# Patient Record
Sex: MALE | Race: BLACK OR AFRICAN AMERICAN | NOT HISPANIC OR LATINO | Employment: FULL TIME | URBAN - METROPOLITAN AREA
[De-identification: names, ages, dates, MRNs, and addresses within clinical notes are randomized per-mention and may not be internally consistent; named-entity substitution may affect disease eponyms.]

---

## 2017-08-03 ENCOUNTER — HOSPITAL ENCOUNTER (EMERGENCY)
Facility: OTHER | Age: 32
Discharge: HOME | End: 2017-08-03
Attending: EMERGENCY MEDICINE

## 2017-08-03 VITALS
SYSTOLIC BLOOD PRESSURE: 121 MMHG | TEMPERATURE: 98 F | HEIGHT: 71 IN | WEIGHT: 208 LBS | OXYGEN SATURATION: 99 % | BODY MASS INDEX: 29.12 KG/M2 | DIASTOLIC BLOOD PRESSURE: 76 MMHG | HEART RATE: 61 BPM | RESPIRATION RATE: 14 BRPM

## 2017-08-03 DIAGNOSIS — S83.401A SPRAIN OF COLLATERAL LIGAMENT OF RIGHT KNEE, INITIAL ENCOUNTER: Primary | ICD-10-CM

## 2017-08-03 DIAGNOSIS — M62.838 TRAPEZIUS MUSCLE SPASM: ICD-10-CM

## 2017-08-03 DIAGNOSIS — T14.90XA TRAUMA: ICD-10-CM

## 2017-08-03 DIAGNOSIS — S40.012A CONTUSION OF LEFT SHOULDER, INITIAL ENCOUNTER: ICD-10-CM

## 2017-08-03 PROCEDURE — 29505 APPLICATION LONG LEG SPLINT: CPT

## 2017-08-03 PROCEDURE — 25000003 PHARM REV CODE 250: Performed by: EMERGENCY MEDICINE

## 2017-08-03 PROCEDURE — 99284 EMERGENCY DEPT VISIT MOD MDM: CPT | Mod: 25

## 2017-08-03 RX ORDER — IBUPROFEN 400 MG/1
800 TABLET ORAL
Status: COMPLETED | OUTPATIENT
Start: 2017-08-03 | End: 2017-08-03

## 2017-08-03 RX ORDER — IBUPROFEN 600 MG/1
600 TABLET ORAL EVERY 6 HOURS PRN
Qty: 20 TABLET | Refills: 0 | Status: SHIPPED | OUTPATIENT
Start: 2017-08-03 | End: 2019-11-12 | Stop reason: SDUPTHER

## 2017-08-03 RX ORDER — CYCLOBENZAPRINE HCL 10 MG
10 TABLET ORAL 3 TIMES DAILY PRN
Qty: 15 TABLET | Refills: 0 | Status: SHIPPED | OUTPATIENT
Start: 2017-08-03 | End: 2017-08-08

## 2017-08-03 RX ORDER — HYDROCODONE BITARTRATE AND ACETAMINOPHEN 5; 325 MG/1; MG/1
1 TABLET ORAL EVERY 4 HOURS PRN
Qty: 6 TABLET | Refills: 0 | Status: SHIPPED | OUTPATIENT
Start: 2017-08-03 | End: 2017-08-13

## 2017-08-03 RX ORDER — CYCLOBENZAPRINE HCL 10 MG
10 TABLET ORAL
Status: COMPLETED | OUTPATIENT
Start: 2017-08-03 | End: 2017-08-03

## 2017-08-03 RX ORDER — HYDROCODONE BITARTRATE AND ACETAMINOPHEN 5; 325 MG/1; MG/1
1 TABLET ORAL
Status: COMPLETED | OUTPATIENT
Start: 2017-08-03 | End: 2017-08-03

## 2017-08-03 RX ADMIN — HYDROCODONE BITARTRATE AND ACETAMINOPHEN 1 TABLET: 5; 325 TABLET ORAL at 07:08

## 2017-08-03 RX ADMIN — IBUPROFEN 800 MG: 400 TABLET, FILM COATED ORAL at 07:08

## 2017-08-03 RX ADMIN — CYCLOBENZAPRINE HYDROCHLORIDE 10 MG: 10 TABLET, FILM COATED ORAL at 07:08

## 2017-08-04 NOTE — ED PROVIDER NOTES
"Encounter Date: 8/3/2017    SCRIBE #1 NOTE: I, Brenda Gray, am scribing for, and in the presence of, Dr. Hendrickson.       History     Chief Complaint   Patient presents with    Knee Pain     knee and L shoulder pain after collision with another player playing basketball     Time seen by provider: 7:07 PM    This is a 32 y.o. male who presents to the ED with complaint of right knee and left shoulder pain. Symptoms began 22 hours ago s/p a collision with another person while playing basketball. Patiet reports that another player's elbow came down on his left shoulder and stepped on his right ankle.  The patient states that when the ankle was injured he felt a "pop" in his right knee.  The pain is located on the medial side of the right knee and is described as "kicking." It is worse with bending of the knee and rates 9/10. He describes his left shoulder pain as "sharp." He reports that he was unable to walk away afterwards and has been limping since. He also notes pain in the left side of his neck, but denies any fever, chills, nausea, vomiting, congestion, sore throat, back pain, abdominal pain, SOB, CP, numbness, tingling or loss of consciousness. He has not taken any medication for the pain. He has no hx of HTN, DM or asthma, and denies use of any tobacco, alcohol, or illicit drugs. NKDA, no PSHX.      The history is provided by the patient.     Review of patient's allergies indicates:   Allergen Reactions    Latex, natural rubber      History reviewed. No pertinent past medical history.  History reviewed. No pertinent surgical history.  History reviewed. No pertinent family history.  Social History   Substance Use Topics    Smoking status: Never Smoker    Smokeless tobacco: Not on file    Alcohol use Not on file     Review of Systems   Constitutional: Negative for chills and fever.   HENT: Negative for congestion and sore throat.    Respiratory: Negative for cough and shortness of breath.    Cardiovascular: " Negative for chest pain.   Gastrointestinal: Negative for abdominal pain, nausea and vomiting.   Genitourinary: Negative for dysuria.   Musculoskeletal: Positive for neck pain (left-sided). Negative for back pain.        Positive for left shoulder pain. Positive for right knee pain.   Skin: Negative for rash.   Neurological: Negative for syncope, weakness and numbness.   Hematological: Does not bruise/bleed easily.   Psychiatric/Behavioral: Negative for behavioral problems.       Physical Exam     Initial Vitals [08/03/17 1808]   BP Pulse Resp Temp SpO2   126/79 67 16 98.4 °F (36.9 °C) 96 %      MAP       94.67         Physical Exam    Nursing note and vitals reviewed.  Constitutional: He appears well-developed and well-nourished. He is not diaphoretic. No distress.   HENT:   Head: Normocephalic and atraumatic.   Nose: Nose normal.   Mouth/Throat: Oropharynx is clear and moist.   Eyes: Conjunctivae and EOM are normal. Pupils are equal, round, and reactive to light. No scleral icterus.   Neck: Normal range of motion. Neck supple. No JVD present.   Cardiovascular: Normal rate, regular rhythm and normal heart sounds. Exam reveals no gallop and no friction rub.    No murmur heard.  Pulmonary/Chest: Breath sounds normal. No respiratory distress. He has no wheezes. He has no rhonchi. He has no rales.   Abdominal: Soft. He exhibits no distension. There is no tenderness. There is no rebound and no guarding.   Musculoskeletal: He exhibits tenderness (Tenderness to palpation of the left upper head of the trapezius with palpable muscle spasm. No tenderness over left AC joint. Tenderness to palpation in deep left axilla. Tenderness to the medial acpect of the right knee.  ). He exhibits no edema.   No midline spinal tenderness. Painful range of motion of left shoulder. No effusion present in right knee. Mild crepitus present in the right knee. Negative anterior drawer's sign. Slight laxity with valgus stress.  No tenderness to  palpation of ankle joint, elbow joints, wrists.   Neurological: He is alert and oriented to person, place, and time. He has normal strength.   Normal sensation.   Skin: Skin is warm and dry. No rash and no abscess noted. No erythema. No pallor.   No overlying skin change of right knee.   Psychiatric: He has a normal mood and affect. His behavior is normal. Judgment and thought content normal.         ED Course   Splint Application  Date/Time: 8/3/2017 7:40 PM  Performed by: CLARIBEL BENSON  Authorized by: CLARIBEL BENSON   Location details: right knee  Splint type: knee immobilizer.  Supplies used: cotton padding  Post-procedure: The splinted body part was neurovascularly unchanged following the procedure.  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  Correlation with physical findings is recommended.  No convincing evidence for acute fracture, bone destruction, or dislocation.      Electronically signed by: DUKE MANUEL MD  Date:     08/03/17  Time:    19:43              Narrative:    AP, lateral, and sunrise views of the right knee were obtained.  There is a curvilinear lucency within the cephalad and lateral aspect of the right patella.  This is likely related to a bipartite patella rather than an acute fracture.  The remainder of the bones appear intact.  There is no evidence for dislocation.  There is no plain film evidence for joint effusion.  Soft tissues are unremarkable.                             X-Ray Shoulder Trauma Left (Final result)  Result time 08/03/17 19:45:08    Final result by Duke Manuel MD (08/03/17 19:45:08)                 Impression:      No evidence for acute  fracture, bone destruction, or dislocation.      Electronically signed by: HIRA JOHNSON MD  Date:     08/03/17  Time:    19:45              Narrative:    3 views of the left shoulder were obtained.  The bones are intact.  There is no evidence for acute fracture or bone destruction.  Joint spaces are well-maintained.  There is no evidence for dislocation.  Soft tissues are unremarkable.                                   X-Rays:   Independently Interpreted Readings:   Other Readings:  Left shoulder: No fracture or dislocation. No acute process.  Right knee: There appears to be an old fracture to the superior lateral aspect of the patella. No other fracture or dislocation.     Medical Decision Making:   Independently Interpreted Test(s):   I have ordered and independently interpreted X-rays - see prior notes.  Clinical Tests:   Radiological Study: Ordered and Reviewed  ED Management:  Emergent evaluation of 32-year-old male with complaint of left shoulder and right knee pain after trauma.  Vital signs are benign, afebrile.  X-ray of the shoulder shows no acute process, and I suspect contusion with muscle spasm.  X-ray of the right knee shows a bipartite patella but no acute finding.  Based on his physical exam I suspect injury to the medial collateral ligament.  He was placed in an immobilizer and treated with anti-inflammatories, muscle relaxers, and Norco.  He's discharged in good condition with crutches and prescription for same medications.  He is advised close follow-up with orthopedics or return for any new or worsening symptoms.    Additional MDM:   X-Rays: I have independently interpreted X-Ray(s) - see notes.          Scribe Attestation:   Scribe #1: I performed the above scribed service and the documentation accurately describes the services I performed. I attest to the accuracy of the note.    Attending Attestation:           Physician Attestation for Scribe:  Physician Attestation Statement for Scribe #1:  I, Dr. Hendrickson, reviewed documentation, as scribed by Brenda Gray in my presence, and it is both accurate and complete.                 ED Course     Clinical Impression:     1. Sprain of collateral ligament of right knee, initial encounter    2. Trauma    3. Contusion of left shoulder, initial encounter    4. Trapezius muscle spasm                                 Shanthi Hendrickson MD  08/04/17 0993

## 2017-08-04 NOTE — ED NOTES
Pt given d/c instructions to include medications and follow up care and verbalized understanding. Pt verbalized understanding. Pt ambulated with proper crutch use and without complication to d/c window. Pt d/c in stable condition.

## 2019-11-12 ENCOUNTER — HOSPITAL ENCOUNTER (EMERGENCY)
Facility: OTHER | Age: 34
Discharge: HOME | End: 2019-11-12
Attending: EMERGENCY MEDICINE

## 2019-11-12 VITALS
TEMPERATURE: 99 F | HEIGHT: 71 IN | HEART RATE: 66 BPM | RESPIRATION RATE: 16 BRPM | WEIGHT: 214 LBS | DIASTOLIC BLOOD PRESSURE: 88 MMHG | OXYGEN SATURATION: 98 % | SYSTOLIC BLOOD PRESSURE: 132 MMHG | BODY MASS INDEX: 29.96 KG/M2

## 2019-11-12 DIAGNOSIS — S86.911A KNEE STRAIN, RIGHT, INITIAL ENCOUNTER: Primary | ICD-10-CM

## 2019-11-12 DIAGNOSIS — T14.90XA INJURY: ICD-10-CM

## 2019-11-12 PROCEDURE — 99284 EMERGENCY DEPT VISIT MOD MDM: CPT | Mod: 25

## 2019-11-12 PROCEDURE — 96372 THER/PROPH/DIAG INJ SC/IM: CPT

## 2019-11-12 PROCEDURE — 63600175 PHARM REV CODE 636 W HCPCS: Performed by: PHYSICIAN ASSISTANT

## 2019-11-12 RX ORDER — IBUPROFEN 800 MG/1
800 TABLET ORAL EVERY 6 HOURS PRN
Qty: 30 TABLET | Refills: 0 | Status: SHIPPED | OUTPATIENT
Start: 2019-11-12

## 2019-11-12 RX ORDER — KETOROLAC TROMETHAMINE 30 MG/ML
30 INJECTION, SOLUTION INTRAMUSCULAR; INTRAVENOUS
Status: COMPLETED | OUTPATIENT
Start: 2019-11-12 | End: 2019-11-12

## 2019-11-12 RX ADMIN — KETOROLAC TROMETHAMINE 30 MG: 30 INJECTION, SOLUTION INTRAMUSCULAR; INTRAVENOUS at 05:11

## 2019-11-12 NOTE — ED PROVIDER NOTES
"Encounter Date: 11/12/2019       History     Chief Complaint   Patient presents with    Knee Pain     R knee, reports R knee swelling.  "I fell playing ball yesterday.  My leg got caught under me."  Ambulatory but limping.     34-year-old male with no significant past medical history presents emergency department with complaints of right knee pain. He reports pain to the medial aspect of the knee after an injury while playing basketball.  Patient states that he fell back with his leg underneath his buttocks.  He reports that he has had pain ever since the injury yesterday.  He admits to taking ibuprofen without relief of the pain.  He admits to a previous injury to his knee but is unsure of which knee he had injured in the past.  According to patient's chart it was the right knee and the pain was in the same location.  He admits that he is able to ambulate however states that it is difficult and painful.  He complains of pain as an 8/10.    The history is provided by the patient.     Review of patient's allergies indicates:   Allergen Reactions    Latex, natural rubber      History reviewed. No pertinent past medical history.  History reviewed. No pertinent surgical history.  History reviewed. No pertinent family history.  Social History     Tobacco Use    Smoking status: Never Smoker   Substance Use Topics    Alcohol use: Not on file    Drug use: Not on file     Review of Systems   Respiratory: Negative for shortness of breath.    Cardiovascular: Negative for chest pain.   Musculoskeletal: Positive for arthralgias (Right knee pain) and gait problem. Negative for back pain and joint swelling.   Skin: Negative for rash.   Neurological: Negative for weakness and numbness.   Hematological: Does not bruise/bleed easily.       Physical Exam     Initial Vitals [11/12/19 1611]   BP Pulse Resp Temp SpO2   122/77 62 14 98.7 °F (37.1 °C) 97 %      MAP       --         Physical Exam    Nursing note and vitals " reviewed.  Constitutional: He appears well-developed and well-nourished.  Non-toxic appearance. No distress.   HENT:   Head: Normocephalic and atraumatic.   Right Ear: External ear normal.   Left Ear: External ear normal.   Nose: Nose normal.   Eyes: Conjunctivae and lids are normal. No scleral icterus.   Neck: Normal range of motion and phonation normal.   Musculoskeletal: Normal range of motion. He exhibits tenderness. He exhibits no edema.   Right knee-palpable deformity to the patella which patient reports is chronic.  No tenderness palpation. He has pain with valgus strain with pain and tenderness palpation to the medial aspect of the right knee.  No significant edema and erythema, ecchymosis. No palpable masses, lesions or cords.  Full range of motion of the knee with pain.   Neurological: He is alert and oriented to person, place, and time. He displays no atrophy. He exhibits normal muscle tone. GCS eye subscore is 4. GCS verbal subscore is 5. GCS motor subscore is 6.   Skin: Skin is warm, dry and intact. Capillary refill takes less than 2 seconds. No abrasion, no bruising, no ecchymosis, no laceration and no rash noted.   Psychiatric: He has a normal mood and affect. His speech is normal and behavior is normal. Judgment normal. Cognition and memory are normal.         ED Course   Procedures  Labs Reviewed - No data to display       Imaging Results          X-Ray Knee 3 View Right (Final result)  Result time 11/12/19 16:43:39    Final result by Vernon Carson MD (11/12/19 16:43:39)                 Impression:      No acute findings.  Bipartite patella.      Electronically signed by: Vernon Carson MD  Date:    11/12/2019  Time:    16:43             Narrative:    EXAMINATION:  XR KNEE 3 VIEW RIGHT    CLINICAL HISTORY:  Injury, unspecified, initial encounter    TECHNIQUE:  AP, lateral, and Merchant views of the right knee were performed.    COMPARISON:  08/03/2017    FINDINGS:  No acute fracture or  dislocation.  No large knee joint effusion.  There is a bipartite patella with enthesopathy at the superior pole of the patella.  Joint spaces are relatively well maintained.                                 Medical Decision Making:   History:   Old Medical Records: I decided to obtain old medical records.  Initial Assessment:   34-year-old male with complaints consistent with right knee strain status post injury while playing basketball.  Vital signs stable, afebrile, neurovascularly intact.  He is alert and healthy and nontoxic appearing.  He is not distressed.  Exam documented above.  Concerns for strain versus fracture versus dislocation versus other soft tissue injury. No evidence of SBI or septic joint  Clinical Tests:   Radiological Study: Ordered and Reviewed  ED Management:  X-rays obtained with no evidence of fracture or dislocation.  Patient has a bipartite patella.  I am concerned for ligament strain.  Ace wrap is applied he was given crutches for comfort.  Administered Toradol in the emergency department.  Will discharge home with ibuprofen and care instructions.  He is urged to follow up with orthopedist at 1st available appointment or return to the emergency department for any worsening signs or symptoms otherwise.  He states understanding agrees with this plan.  This is the extent of patient's complaints today.  This patient was discussed with the attending physician who agrees with treatment plan  Other:   I have discussed this case with another health care provider.       <> Summary of the Discussion: Wilber  This note was created using [employer] dictation.  There may be typographical errors secondary to dictation.                                   Clinical Impression:     1. Knee strain, right, initial encounter    2. Injury            Disposition:   Disposition: Discharged  Condition: Stable                     Lelo Martell PA-C  11/12/19 5611

## 2019-11-12 NOTE — ED TRIAGE NOTES
Right knee swelling and pain with movement. Denies any other symptoms. Alert and oriented ambulatory respirations even unlabored.

## 2022-08-10 ENCOUNTER — HOSPITAL ENCOUNTER (EMERGENCY)
Facility: OTHER | Age: 37
Discharge: HOME | End: 2022-08-10
Attending: EMERGENCY MEDICINE

## 2022-08-10 VITALS
HEIGHT: 71 IN | BODY MASS INDEX: 29.26 KG/M2 | TEMPERATURE: 98 F | RESPIRATION RATE: 18 BRPM | OXYGEN SATURATION: 99 % | WEIGHT: 209 LBS | HEART RATE: 61 BPM | DIASTOLIC BLOOD PRESSURE: 75 MMHG | SYSTOLIC BLOOD PRESSURE: 129 MMHG

## 2022-08-10 DIAGNOSIS — R10.13 EPIGASTRIC ABDOMINAL PAIN: Primary | ICD-10-CM

## 2022-08-10 LAB
CTP QC/QA: YES
SARS-COV-2 RDRP RESP QL NAA+PROBE: NEGATIVE

## 2022-08-10 PROCEDURE — 99283 EMERGENCY DEPT VISIT LOW MDM: CPT

## 2022-08-10 PROCEDURE — 25000003 PHARM REV CODE 250: Performed by: EMERGENCY MEDICINE

## 2022-08-10 PROCEDURE — U0002 COVID-19 LAB TEST NON-CDC: HCPCS | Performed by: EMERGENCY MEDICINE

## 2022-08-10 RX ORDER — FAMOTIDINE 20 MG/1
20 TABLET, FILM COATED ORAL 2 TIMES DAILY
Qty: 20 TABLET | Refills: 0 | Status: SHIPPED | OUTPATIENT
Start: 2022-08-10 | End: 2023-08-10

## 2022-08-10 RX ORDER — LIDOCAINE HYDROCHLORIDE 20 MG/ML
15 SOLUTION OROPHARYNGEAL ONCE
Status: COMPLETED | OUTPATIENT
Start: 2022-08-10 | End: 2022-08-10

## 2022-08-10 RX ORDER — MAG HYDROX/ALUMINUM HYD/SIMETH 200-200-20
30 SUSPENSION, ORAL (FINAL DOSE FORM) ORAL ONCE
Status: COMPLETED | OUTPATIENT
Start: 2022-08-10 | End: 2022-08-10

## 2022-08-10 RX ADMIN — ALUMINUM HYDROXIDE, MAGNESIUM HYDROXIDE, AND SIMETHICONE 30 ML: 200; 200; 20 SUSPENSION ORAL at 08:08

## 2022-08-10 RX ADMIN — LIDOCAINE HYDROCHLORIDE 15 ML: 20 SOLUTION ORAL; TOPICAL at 08:08

## 2022-08-10 NOTE — ED NOTES
Pt educated on discharge instructions, prescription use and follow up care, pt verbalized understanding, denies any questions

## 2022-08-10 NOTE — ED PROVIDER NOTES
SCRIBE #1 NOTE: INena, am scribing for, and in the presence of, Brody Nguyen DO.         Source of History:  The patient.    Chief complaint:  Abdominal Pain (Pt having lower abd pain, diarrhea, dizziness and Ha x2 days. Denies cough, CP and SOB.), Diarrhea, Dizziness, and Headache      HPI:  Ashlyn Najera is a 37 y.o. male presenting with lower abdominal pain for the past three days. The patient reports his pain began as a cramping feeling, became burning, and is now mostly mild intermittent cramping. His pain was worse going over road bumps. He notes associated headache and dizziness. He reports he had one episode of diarrhea when his symptoms began three days ago, but denies any episodes since then. The patient states he has found some relief from his symptoms by lying on his side and taking Aleve. He denies nausea, vomiting, cough, or congestion. He denies abnormal urination or fever. 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 He denies a history of similar issues or any significant medical issues, and denies EtOH or cigarette use.    This is the extent to the patients complaints today here in the emergency department.    ROS: As per HPI and below:  Constitutional: No fever.  No chills.  Eyes: No visual changes.  ENT: No sore throat. No ear pain. No congestion.  Cardiovascular: No chest pain.  Respiratory: No shortness of breath. No cough.  GI: Notes lower abdominal pain. Notes diarrhea (resolved). No nausea. No vomiting.  Genitourinary: No abnormal urination.  Neurologic: Notes headache. Notes dizziness. No focal weakness.  No numbness.  MSK: no back pain.  Integument: No rashes or lesions.  Hematologic: No easy bruising.  Endocrine: No excessive thirst or urination.    Review of patient's allergies indicates:   Allergen Reactions    Latex, natural rubber        PMH:  As per HPI and below:  No past medical history on file.  No  "past surgical history on file.    Social History     Tobacco Use    Smoking status: Never Smoker       Physical Exam:    /75 (BP Location: Left arm, Patient Position: Sitting)   Pulse 61   Temp 97.5 °F (36.4 °C) (Oral)   Resp 18   Ht 5' 11" (1.803 m)   Wt 94.8 kg (209 lb)   SpO2 99%   BMI 29.15 kg/m²   Nursing note and vital signs reviewed.  Constitutional: No acute distress.  Nontoxic  Eyes: No conjunctival injection.  Extraocular muscles are intact.  ENT: Oropharynx clear.  Normal phonation.  Cardiovascular: Regular rate and rhythm.  No murmurs. No gallops. No rubs  Respiratory: Clear to auscultation bilaterally.  Good air movement.  No wheezes.  No rhonchi. No rales. No accessory muscle use.  Abdomen:  Epigastric tenderness to palpation.  Musculoskeletal: Good range of motion all joints.  No deformities.  Neck supple.  No meningismus.  Skin: No rashes seen.  Good turgor.  No abrasions.  No ecchymoses.  Neuro: alert and oriented x3,  no focal neurological deficits.  Psych: Appropriate, conversant    Labs that have been ordered have been independently reviewed and interpreted by myself.    I decided to obtain the patient's medical records.  Summary of Medical Records:      MDM/ Differential Dx:   37 y.o. male with patient with 2 days of epigastric pain.  Reproducible on examination.  Negative Juarez sign negative McBurney's.  Suspect gastritis.  Lower suspicion of pancreatitis or cholelithiasis/cholecystitis.  No evidence of bowel obstruction or appendicitis diverticulitis.  Will give a GI cocktail and re-evaluate.    ED Course as of 08/10/22 0828   Wed Aug 10, 2022   0826 Upon re-evaluation patient is feeling better after the GI cocktail.  I suspect the symptoms most likely secondary to gastritis.  Will discharge with Pepcid and recommendations for Maalox.    No further workup is indicated in the emergency department today.  I updated pt regarding results and I counseled pt regarding supportive care " measures.  Diagnosis and treatment plan explained to patient. I have answered all questions and the patient is satisfied with the plan of care. Patient discharged home in stable condition.  [SM]      ED Course User Index  [SM] Brody Nguyen DO              Scribe Attestation:   Scribe #1: I performed the above scribed service and the documentation accurately describes the services I performed. I attest to the accuracy of the note.    Physician Attestation for Scribe: I, Dr Brody Nguyen, reviewed documentation as scribed in my presence, which is both accurate and complete.     Diagnostic Impression:    1. Epigastric abdominal pain         ED Disposition Condition    Discharge Stable          ED Prescriptions     Medication Sig Dispense Start Date End Date Auth. Provider    famotidine (PEPCID) 20 MG tablet Take 1 tablet (20 mg total) by mouth 2 (two) times daily. 20 tablet 8/10/2022 8/10/2023 Brody Nguyen DO        Follow-up Information     Follow up With Specialties Details Why Contact Info    [facility] Locations Gastroenterology Schedule an appointment as soon as possible for a visit in 2 weeks  [address]  [address]  [place] [address]  [phone]             Brody Nguyen DO  08/10/22 0897

## 2022-08-10 NOTE — ED NOTES
Pt ambulated to rm 11 with steady gait, pt is AAOX4, even unlabored resp noted, VSS, NADN. Pt c/o generalized abdominal pain and diarrhea X3 days, denies any n/v. Abdomen flat soft tender to touch, bowel sounds normal active. +pain when urinating. Last BM yesterday

## 2023-05-13 ENCOUNTER — HOSPITAL ENCOUNTER (EMERGENCY)
Facility: OTHER | Age: 38
Discharge: HOME | End: 2023-05-13
Attending: EMERGENCY MEDICINE

## 2023-05-13 VITALS
OXYGEN SATURATION: 97 % | DIASTOLIC BLOOD PRESSURE: 84 MMHG | TEMPERATURE: 98 F | RESPIRATION RATE: 18 BRPM | HEIGHT: 71 IN | HEART RATE: 80 BPM | SYSTOLIC BLOOD PRESSURE: 138 MMHG | WEIGHT: 214 LBS | BODY MASS INDEX: 29.96 KG/M2

## 2023-05-13 DIAGNOSIS — T14.90XA INJURY: ICD-10-CM

## 2023-05-13 DIAGNOSIS — M25.562 ACUTE PAIN OF LEFT KNEE: Primary | ICD-10-CM

## 2023-05-13 DIAGNOSIS — S83.92XA SPRAIN OF LEFT KNEE, UNSPECIFIED LIGAMENT, INITIAL ENCOUNTER: ICD-10-CM

## 2023-05-13 PROCEDURE — 99284 EMERGENCY DEPT VISIT MOD MDM: CPT | Mod: 25

## 2023-05-13 PROCEDURE — 96372 THER/PROPH/DIAG INJ SC/IM: CPT | Performed by: PHYSICIAN ASSISTANT

## 2023-05-13 PROCEDURE — 25000003 PHARM REV CODE 250: Performed by: PHYSICIAN ASSISTANT

## 2023-05-13 PROCEDURE — 29505 APPLICATION LONG LEG SPLINT: CPT | Mod: LT

## 2023-05-13 PROCEDURE — 63600175 PHARM REV CODE 636 W HCPCS: Performed by: PHYSICIAN ASSISTANT

## 2023-05-13 RX ORDER — IBUPROFEN 600 MG/1
600 TABLET ORAL
Status: DISCONTINUED | OUTPATIENT
Start: 2023-05-13 | End: 2023-05-13

## 2023-05-13 RX ORDER — OXYCODONE AND ACETAMINOPHEN 10; 325 MG/1; MG/1
1 TABLET ORAL
Status: COMPLETED | OUTPATIENT
Start: 2023-05-13 | End: 2023-05-13

## 2023-05-13 RX ORDER — OXYCODONE AND ACETAMINOPHEN 10; 325 MG/1; MG/1
1 TABLET ORAL EVERY 6 HOURS PRN
Qty: 12 TABLET | Refills: 0 | Status: SHIPPED | OUTPATIENT
Start: 2023-05-13

## 2023-05-13 RX ORDER — KETOROLAC TROMETHAMINE 30 MG/ML
30 INJECTION, SOLUTION INTRAMUSCULAR; INTRAVENOUS
Status: COMPLETED | OUTPATIENT
Start: 2023-05-13 | End: 2023-05-13

## 2023-05-13 RX ADMIN — OXYCODONE AND ACETAMINOPHEN 1 TABLET: 10; 325 TABLET ORAL at 08:05

## 2023-05-13 RX ADMIN — KETOROLAC TROMETHAMINE 30 MG: 30 INJECTION, SOLUTION INTRAMUSCULAR; INTRAVENOUS at 07:05

## 2023-05-13 NOTE — FIRST PROVIDER EVALUATION
Emergency Department TeleTriage Encounter Note      CHIEF COMPLAINT    Chief Complaint   Patient presents with    Leg Pain     Pt presents to the ER with complaints of left leg pain after slipping and falling on a bent leg while playing soccer. Pt states he is unable to walk forward due to the pain.         VITAL SIGNS   Initial Vitals [05/13/23 1729]   BP Pulse Resp Temp SpO2   (!) 142/73 105 16 98.1 °F (36.7 °C) 97 %      MAP       --            ALLERGIES    Review of patient's allergies indicates:   Allergen Reactions    Latex, natural rubber        PROVIDER TRIAGE NOTE  38 year old male presents to the ER with complaints of left knee pain, swelling and injury that began this afternoon while playing soccer. He was running, and knee buckled from under him. Has been unable to walk since this injury. No other injuries. No open wounds    Exam: AAOx3, resp even and non labored, appears in no acute distress.           ORDERS  Labs Reviewed - No data to display    ED Orders (720h ago, onward)      None              Virtual Visit Note: The provider triage portion of this emergency department evaluation and documentation was performed via [employer], a HIPAA-compliant telemedicine application, in concert with a tele-presenter in the room. A face to face patient evaluation with one of my colleagues will occur once the patient is placed in an emergency department room.      DISCLAIMER: This note was prepared with [employer] voice recognition transcription software. Garbled syntax, mangled pronouns, and other bizarre constructions may be attributed to that software system.

## 2023-05-14 NOTE — ED PROVIDER NOTES
Encounter Date: 5/13/2023       History     Chief Complaint   Patient presents with    Leg Pain     Pt presents to the ER with complaints of left leg pain after slipping and falling on a bent leg while playing soccer. Pt states he is unable to walk forward due to the pain.       38-year-old otherwise healthy male presents ER for evaluation of left knee pain.  Patient states that he was playing [employer] during soccer game when he slipped and fell.  Reports that at the time of the fall, patient's leg twisted to the side and behind him.  Patient reports that he has significant pain upon bending his knee.  Reports that the pain is primarily located to the upper knee and lower thigh.  He states that when he tries to walk forward he has significant pain.  He is able to bear some weight and walk backwards only.  Denies any paresthesia focal weakness.  No prior injuries surgery to the site.  Did not take any medicine prior to arrival to ER today.    The history is provided by the patient.   Review of patient's allergies indicates:   Allergen Reactions    Latex, natural rubber      No past medical history on file.  No past surgical history on file.  No family history on file.  Social History     Tobacco Use    Smoking status: Never     Review of Systems   Constitutional:  Negative for chills and fever.   Eyes:  Negative for visual disturbance.   Respiratory:  Negative for shortness of breath.    Cardiovascular:  Negative for chest pain.   Gastrointestinal:  Negative for nausea and vomiting.   Genitourinary:  Negative for dysuria and flank pain.   Musculoskeletal:  Positive for arthralgias and joint swelling. Negative for myalgias.   Skin:  Negative for rash.   Allergic/Immunologic: Negative for immunocompromised state.   Neurological:  Negative for weakness and numbness.   Hematological:  Does not bruise/bleed easily.   Psychiatric/Behavioral:  Negative for confusion.      Physical Exam     Initial Vitals [05/13/23 1729]   BP  Pulse Resp Temp SpO2   (!) 142/73 105 16 98.1 °F (36.7 °C) 97 %      MAP       --         Physical Exam    Vitals reviewed.  Constitutional: He appears well-developed and well-nourished. He is not diaphoretic. No distress.   HENT:   Head: Normocephalic and atraumatic.   Eyes: Conjunctivae and EOM are normal.   Neck: Neck supple.   Cardiovascular:  Intact distal pulses.           Pulmonary/Chest: No respiratory distress.   Musculoskeletal:      Cervical back: Neck supple.      Left knee: Swelling and deformity present. Decreased range of motion. Tenderness (suprapatellar region my lower thigh) present over the patellar tendon. No LCL laxity or MCL laxity.Normal pulse.     Neurological: He is alert and oriented to person, place, and time.   Skin: Skin is warm.       ED Course   Procedures  Labs Reviewed - No data to display       Imaging Results              CT Knee Without Contrast Left (Final result)  Result time 05/13/23 21:06:27      Final result by Ashlie Zaidi MD (05/13/23 21:06:27)                   Impression:      No acute fracture or dislocation.      Electronically signed by: Ashlie Zaidi  Date:    05/13/2023  Time:    21:06               Narrative:    EXAMINATION:  CT KNEE WITHOUT CONTRAST LEFT    CLINICAL HISTORY:  Knee trauma, occult fracture suspected, xray done;    TECHNIQUE:  Contiguous axial CT images of the left knee without contrast.  Coronal and sagittal reformatted images were obtained.    COMPARISON:  Same day radiograph    FINDINGS:  Menisci, cruciate, and collaterals are suboptimally characterized.    No acute fracture or dislocation.  Joint spaces are maintained.  Small suprapatellar joint effusion.  Heterotopic ossification within the distal quadriceps tendon.  Mild quadriceps enthesopathy.  Small Baker cyst.  Minimal prepatellar and superficial infrapatellar soft tissue swelling.                                       X-Ray Knee 1 or 2 View Left (Final result)  Result time  05/13/23 19:17:30   Procedure changed from X-Ray Knee 3 View Left     Final result by Ashlie Zaidi MD (05/13/23 19:17:30)                   Impression:      No acute fracture or dislocation.      Electronically signed by: Ashlie Zaidi  Date:    05/13/2023  Time:    19:17               Narrative:    EXAMINATION:  XR KNEE 1 OR 2 VIEW LEFT    CLINICAL HISTORY:  pain;  Injury, unspecified, initial encounter    TECHNIQUE:  Three views    COMPARISON:  None    FINDINGS:  No acute fracture or dislocation.  Joint spaces are maintained.  Quadriceps enthesopathy.  Heterotopic ossification projects over the distal quadriceps tendon.  Small suprapatellar joint effusion.                                       Medications   ketorolac injection 30 mg (30 mg Intramuscular Given 5/13/23 1912)   oxyCODONE-acetaminophen  mg per tablet 1 tablet (1 tablet Oral Given 5/13/23 2015)           APC / Resident Notes:   Patient seen in the ER promptly upon arrival.  He is afebrile, no acute distress.  Physical examination reveals swelling and suprapatellar swelling.  Range of motion of the knees limited due to pain.  He does have tenderness to the distal anterior thigh as well as the upper patella.  No laxity noted on exam.  Distal pulses intact and equal bilaterally.    Concern for quadricep tendon tear, patellar tendon injury, ligamentous injury, meniscal tear, knee fracture    Given Toradol for pain control.      ED Course as of 05/14/23 1009   Sat May 13, 2023   2036 X-ray of the knees unremarkable. [AJ]   2036 On reassessment patient still continues to have severe pain.  Will obtain CT to rule out evidence of occult fracture [AJ]   2036 If CT is negative, likely discharge home with knee brace and outpatient MRI and orthopedist follow-up information. [AJ]   2119 CT unremarkable for acute pathology.    Will provide crutches and knee immobilizer to use as directed.  Advised ice elevate and rest.  Advised to contact  orthopedist on Monday for close follow-up and possible outpatient MRI.    He is otherwise stable for discharge and close follow-up    Disclaimer: This note has been generated using voice-recognition software. There may be typographical errors that have been missed during proof-reading.     [AJ]      ED Course User Index  [AJ] Sasha Littlejohn PA-C                 Clinical Impression:   Final diagnoses:  [T14.90XA] Injury  [M25.562] Acute pain of left knee (Primary)  [S83.92XA] Sprain of left knee, unspecified ligament, initial encounter        ED Disposition Condition    Discharge Stable          ED Prescriptions       Medication Sig Dispense Start Date End Date Auth. Provider    oxyCODONE-acetaminophen (PERCOCET)  mg per tablet Take 1 tablet by mouth every 6 (six) hours as needed for Pain. 12 tablet 5/13/2023 -- Sasha Littlejohn PA-C          Follow-up Information       Follow up With Specialties Details Why Contact Info    [facility]   [address] [place] [id number]  [phone]             Sasha Littlejohn PA-C  05/14/23 1018

## 2023-05-14 NOTE — DISCHARGE INSTRUCTIONS
Ice elevate rest.  Use the knee immobilizer until you are seen by the orthopedist.  You may require outpatient MRI if your symptoms persist or worsen.  Use crutches for comfort and support